# Patient Record
Sex: FEMALE | Race: WHITE | NOT HISPANIC OR LATINO | URBAN - METROPOLITAN AREA
[De-identification: names, ages, dates, MRNs, and addresses within clinical notes are randomized per-mention and may not be internally consistent; named-entity substitution may affect disease eponyms.]

---

## 2022-05-24 ENCOUNTER — TRANSCRIBE ORDERS (OUTPATIENT)
Dept: RESPIRATORY THERAPY | Facility: CLINIC | Age: 42
End: 2022-05-24

## 2022-05-24 DIAGNOSIS — J45.909 ASTHMA: Primary | ICD-10-CM

## 2022-07-11 ENCOUNTER — HOSPITAL ENCOUNTER (OUTPATIENT)
Dept: RADIOLOGY | Facility: CLINIC | Age: 42
Discharge: HOME | End: 2022-07-11
Attending: PHYSICAL MEDICINE & REHABILITATION
Payer: OTHER GOVERNMENT

## 2022-07-11 ENCOUNTER — HOSPITAL ENCOUNTER (OUTPATIENT)
Dept: RESPIRATORY THERAPY | Facility: CLINIC | Age: 42
Discharge: HOME | End: 2022-07-11
Attending: PHYSICAL MEDICINE & REHABILITATION
Payer: OTHER GOVERNMENT

## 2022-07-11 DIAGNOSIS — J45.909 ASTHMA: ICD-10-CM

## 2022-07-11 DIAGNOSIS — J45.909 MILD ASTHMA, UNSPECIFIED WHETHER COMPLICATED, UNSPECIFIED WHETHER PERSISTENT: ICD-10-CM

## 2022-07-11 PROCEDURE — 250N000009 HC RX 250

## 2022-07-11 PROCEDURE — 94060 EVALUATION OF WHEEZING: CPT

## 2022-07-11 PROCEDURE — 999N000157 HC STATISTIC RCP TIME EA 10 MIN

## 2022-07-11 PROCEDURE — 71046 X-RAY EXAM CHEST 2 VIEWS: CPT

## 2022-07-11 RX ORDER — ALBUTEROL SULFATE 0.83 MG/ML
2.5 SOLUTION RESPIRATORY (INHALATION) ONCE
Status: COMPLETED | OUTPATIENT
Start: 2022-07-11 | End: 2022-07-11

## 2022-07-11 RX ADMIN — ALBUTEROL SULFATE 2.5 MG: 2.5 SOLUTION RESPIRATORY (INHALATION) at 08:51

## 2022-07-11 NOTE — PROGRESS NOTES
RESPIRATORY CARE NOTE     Patient Name: Cherelle Bird  Today's Date: 7/11/2022     PRE/POST PFT done. Pt performed tests with good effort. Alb neb given.  Test results meet ATS criteria. Results scanned into epic. Pt left in no distress.  Result faxed to EVA Ponce, RT

## 2022-07-14 LAB
EXPTIME-PRE: 6.57 SEC
FEF2575-%PRED-POST: 104 %
FEF2575-%PRED-PRE: 90 %
FEF2575-POST: 3.25 L/SEC
FEF2575-PRE: 2.82 L/SEC
FEF2575-PRED: 3.11 L/SEC
FEFMAX-%PRED-PRE: 79 %
FEFMAX-PRE: 5.42 L/SEC
FEFMAX-PRED: 6.82 L/SEC
FEV1-%PRED-PRE: 97 %
FEV1-PRE: 2.84 L
FEV1FEV6-PRE: 81 %
FEV1FEV6-PRED: 84 %
FEV1FVC-PRE: 81 %
FEV1FVC-PRED: 82 %
FIFMAX-PRE: 4.55 L/SEC
FVC-%PRED-PRE: 99 %
FVC-PRE: 3.52 L
FVC-PRED: 3.55 L

## 2023-05-21 ENCOUNTER — HEALTH MAINTENANCE LETTER (OUTPATIENT)
Age: 43
End: 2023-05-21

## 2024-03-10 ENCOUNTER — HEALTH MAINTENANCE LETTER (OUTPATIENT)
Age: 44
End: 2024-03-10

## 2024-07-28 ENCOUNTER — HEALTH MAINTENANCE LETTER (OUTPATIENT)
Age: 44
End: 2024-07-28